# Patient Record
(demographics unavailable — no encounter records)

---

## 2024-10-11 NOTE — DISCUSSION/SUMMARY
[FreeTextEntry1] : Discussed proper use of medications as well as common and uncommon side effects of medications apply cortisone to face bid rto if no improvement or condition worsens

## 2024-10-11 NOTE — REVIEW OF SYSTEMS
[Rash] : rash [Insect Bites] : insect bites [Negative] : Genitourinary [FreeTextEntry1] : swelling r heel

## 2024-10-25 NOTE — DISCUSSION/SUMMARY
[FreeTextEntry1] : Rash c/w eczema vs tinea. Didn't resolve with eczema treatment so rec treating for tinea with Lotrimin AF 3x/day, x 3-4 weeks. If no improvement after 3 weeks, should see derm. RTC if worsening or other new symptoms.

## 2024-10-25 NOTE — PHYSICAL EXAM
[NL] : no acute distress, alert [de-identified] : small dry annular patch on R side of face under paci

## 2024-10-25 NOTE — HISTORY OF PRESENT ILLNESS
[de-identified] : Rash [FreeTextEntry6] : Used hydrocortisone on the spot on his face and it went away but immediately came back after stopping the hydrocortisone. Doesn't seem to bother him.

## 2025-01-02 NOTE — DISCUSSION/SUMMARY
[] : The components of the vaccine(s) to be administered today are listed in the plan of care. The disease(s) for which the vaccine(s) are intended to prevent and the risks have been discussed with the caretaker.  The risks are also included in the appropriate vaccination information statements which have been provided to the patient's caregiver.  The caregiver has given consent to vaccinate. [FreeTextEntry1] :  Well 18 mo M.  Continue whole cow's milk. Continue table foods, 3 meals with 2-3 snacks per day. Incorporate fluorinated water daily in a sippy cup. Brush teeth twice a day with soft toothbrush. When in car, keep child in rear-facing car seats until age 2, or until  the maximum height and weight for seat is reached. Put toddler to sleep in own bed or crib. Help toddler to maintain consistent daily routines and sleep schedule. Toilet training. Recognize anxiety in new settings. Ensure home is safe. Be within arm's reach of toddler at all times. Use consistent, positive discipline. Read aloud to toddler.  -Pentacel, Flu #2 -Next well visit at 2 yrs.

## 2025-01-02 NOTE — HISTORY OF PRESENT ILLNESS
[Father] : father [Cow's milk (Ounces per day ___)] : consumes [unfilled] oz of Cow's milk per day [Fruit] : fruit [Vegetables] : vegetables [Meat] : meat [Cereal] : cereal [Eggs] : eggs [Table food] : table food [Normal] : Normal [Pacifier use] : Pacifier use [Brushing teeth] : Brushing teeth [Playtime] : Playtime  [de-identified] : Routine questions.

## 2025-01-02 NOTE — PHYSICAL EXAM
[Alert] : alert [No Acute Distress] : no acute distress [Normocephalic] : normocephalic [Anterior [place] Closed] : anterior fontanelle closed [Red Reflex Bilateral] : red reflex bilateral [PERRL] : PERRL [Normally Placed Ears] : normally placed ears [Auricles Well Formed] : auricles well formed [Clear Tympanic membranes with present light reflex and bony landmarks] : clear tympanic membranes with present light reflex and bony landmarks [No Discharge] : no discharge [Nares Patent] : nares patent [Palate Intact] : palate intact [Uvula Midline] : uvula midline [Tooth Eruption] : tooth eruption  [Supple, full passive range of motion] : supple, full passive range of motion [No Palpable Masses] : no palpable masses [Symmetric Chest Rise] : symmetric chest rise [Clear to Auscultation Bilaterally] : clear to auscultation bilaterally [Regular Rate and Rhythm] : regular rate and rhythm [S1, S2 present] : S1, S2 present [No Murmurs] : no murmurs [+2 Femoral Pulses] : +2 femoral pulses [Soft] : soft [NonTender] : non tender [Non Distended] : non distended [No Hepatomegaly] : no hepatomegaly [Normoactive Bowel Sounds] : normoactive bowel sounds [No Splenomegaly] : no splenomegaly [Central Urethral Opening] : central urethral opening [Testicles Descended Bilaterally] : testicles descended bilaterally [Patent] : patent [Normally Placed] : normally placed [No Abnormal Lymph Nodes Palpated] : no abnormal lymph nodes palpated [No Clavicular Crepitus] : no clavicular crepitus [Symmetric Buttocks Creases] : symmetric buttocks creases [No Spinal Dimple] : no spinal dimple [NoTuft of Hair] : no tuft of hair [Cranial Nerves Grossly Intact] : cranial nerves grossly intact [No Rash or Lesions] : no rash or lesions

## 2025-01-15 NOTE — PHYSICAL EXAM
[NL] : regular rate and rhythm, normal S1, S2 audible, no murmurs [Moves All Extremities x 4] : moves all extremities x4 [Warm, Well Perfused x4] : warm, well perfused x4 [de-identified] : edema and erythema of L foot

## 2025-01-15 NOTE — DISCUSSION/SUMMARY
[FreeTextEntry1] : Child is a 18 mo old male here for cellulitis of L foot. Will treat with course of Augmentin, sent to pharmacy. Reviewed for worsening (fever, unable to ambulate, etc), call urgently. RTC for worsening or persistent symptoms.

## 2025-01-15 NOTE — HISTORY OF PRESENT ILLNESS
[de-identified] : foot swelling [FreeTextEntry6] : Child is a 18 mo old male here for foot swelling. Started yesterday. Located L foot. No known injury, puncture wound, etc. No fever. Able to ambulate. Normal appetite.

## 2025-04-01 NOTE — HISTORY OF PRESENT ILLNESS
[de-identified] : MMR Vaccine and Feeding Questions [FreeTextEntry6] : -Traveling to [place] later this month so would like an early MMR vaccine. -Is more of a grazer than a meal eater. Is his weight okay?

## 2025-04-01 NOTE — DISCUSSION/SUMMARY
[] : The components of the vaccine(s) to be administered today are listed in the plan of care. The disease(s) for which the vaccine(s) are intended to prevent and the risks have been discussed with the caretaker.  The risks are also included in the appropriate vaccination information statements which have been provided to the patient's caregiver.  The caregiver has given consent to vaccinate. [FreeTextEntry1] : -MMR with counseling -Excellent interval wt gain, reassured that he's getting enough kcals.

## 2025-06-26 NOTE — DEVELOPMENTAL MILESTONES
[Yes: _______] : yes, [unfilled] [Plays alongside other children] : plays alongside other children [Takes off some clothing] : takes off some clothing [Scoops well with spoon] : scoops well with spoon [Uses 50 words] : uses 50 words [Combine 2 words into phrase or] : does not combine 2 words into phrase or sentences [Follows 2-step command] : follows 2-step command [Uses words that are 50% intelligible] : uses words that are 50% intelligible to strangers [Kicks ball] : kicks ball  [Jumps off ground with 2 feet] : jumps off ground with 2 feet [Runs with coordination] : runs with coordination [Climbs up a ladder at a] : climbs up a ladder at a playground [Turns book pages] : turns book pages [Uses hands to turn objects] : uses hands to turn objects [Passed] : passed

## 2025-06-26 NOTE — PHYSICAL EXAM
[Alert] : alert [No Acute Distress] : no acute distress [Normocephalic] : normocephalic [Anterior [place] Closed] : anterior fontanelle closed [Red Reflex Bilateral] : red reflex bilateral [PERRL] : PERRL [Normally Placed Ears] : normally placed ears [Auricles Well Formed] : auricles well formed [Clear Tympanic membranes with present light reflex and bony landmarks] : clear tympanic membranes with present light reflex and bony landmarks [No Discharge] : no discharge [Nares Patent] : nares patent [Uvula Midline] : uvula midline [Palate Intact] : palate intact [Tooth Eruption] : tooth eruption  [Supple, full passive range of motion] : supple, full passive range of motion [No Palpable Masses] : no palpable masses [Symmetric Chest Rise] : symmetric chest rise [Clear to Auscultation Bilaterally] : clear to auscultation bilaterally [Regular Rate and Rhythm] : regular rate and rhythm [S1, S2 present] : S1, S2 present [No Murmurs] : no murmurs [+2 Femoral Pulses] : +2 femoral pulses [Soft] : soft [NonTender] : non tender [Non Distended] : non distended [Normoactive Bowel Sounds] : normoactive bowel sounds [No Hepatomegaly] : no hepatomegaly [No Splenomegaly] : no splenomegaly [Central Urethral Opening] : central urethral opening [Testicles Descended Bilaterally] : testicles descended bilaterally [Patent] : patent [Normally Placed] : normally placed [No Abnormal Lymph Nodes Palpated] : no abnormal lymph nodes palpated [No Clavicular Crepitus] : no clavicular crepitus [Symmetric Buttocks Creases] : symmetric buttocks creases [No Spinal Dimple] : no spinal dimple [NoTuft of Hair] : no tuft of hair [Cranial Nerves Grossly Intact] : cranial nerves grossly intact [No Rash or Lesions] : no rash or lesions

## 2025-06-26 NOTE — HISTORY OF PRESENT ILLNESS
[Parents] : parents [Fruit] : fruit [Meat] : meat [Cereal] : cereal [Eggs] : eggs [Table food] : table food [Dairy] : dairy [Normal] : Normal [In crib] : In crib [Brushing teeth] : Brushing teeth [Yes] : Patient goes to dentist yearly [Toothpaste] : Primary Fluoride Source: Toothpaste [Vegetables] : vegetables [Playtime 60 min a day] : Playtime 60 min a day [Temper Tantrums] : Temper Tantrums [Toilet Training] : Toilet training [de-identified] : Speech. Many words, not really putting them together. Mainly speak Citizen of [place] at home but also exposed to English. [de-identified] : Picky with veggies but keep trying [FreeTextEntry9] : not quite ready for potty training but sometimes will sit on the potty

## 2025-06-26 NOTE — PHYSICAL EXAM
[Alert] : alert [No Acute Distress] : no acute distress [Normocephalic] : normocephalic [Anterior [place] Closed] : anterior fontanelle closed [Red Reflex Bilateral] : red reflex bilateral [PERRL] : PERRL [Normally Placed Ears] : normally placed ears [Auricles Well Formed] : auricles well formed [Clear Tympanic membranes with present light reflex and bony landmarks] : clear tympanic membranes with present light reflex and bony landmarks [No Discharge] : no discharge [Nares Patent] : nares patent [Palate Intact] : palate intact [Uvula Midline] : uvula midline [Tooth Eruption] : tooth eruption  [Supple, full passive range of motion] : supple, full passive range of motion [No Palpable Masses] : no palpable masses [Symmetric Chest Rise] : symmetric chest rise [Clear to Auscultation Bilaterally] : clear to auscultation bilaterally [Regular Rate and Rhythm] : regular rate and rhythm [S1, S2 present] : S1, S2 present [No Murmurs] : no murmurs [+2 Femoral Pulses] : +2 femoral pulses [Soft] : soft [NonTender] : non tender [Non Distended] : non distended [Normoactive Bowel Sounds] : normoactive bowel sounds [No Hepatomegaly] : no hepatomegaly [No Splenomegaly] : no splenomegaly [Central Urethral Opening] : central urethral opening [Testicles Descended Bilaterally] : testicles descended bilaterally [Patent] : patent [Normally Placed] : normally placed [No Abnormal Lymph Nodes Palpated] : no abnormal lymph nodes palpated [No Clavicular Crepitus] : no clavicular crepitus [Symmetric Buttocks Creases] : symmetric buttocks creases [No Spinal Dimple] : no spinal dimple [NoTuft of Hair] : no tuft of hair [Cranial Nerves Grossly Intact] : cranial nerves grossly intact [No Rash or Lesions] : no rash or lesions

## 2025-06-26 NOTE — HISTORY OF PRESENT ILLNESS
[Parents] : parents [Fruit] : fruit [Meat] : meat [Cereal] : cereal [Eggs] : eggs [Table food] : table food [Dairy] : dairy [Normal] : Normal [In crib] : In crib [Brushing teeth] : Brushing teeth [Yes] : Patient goes to dentist yearly [Toothpaste] : Primary Fluoride Source: Toothpaste [Vegetables] : vegetables [Playtime 60 min a day] : Playtime 60 min a day [Temper Tantrums] : Temper Tantrums [Toilet Training] : Toilet training [de-identified] : Speech. Many words, not really putting them together. Mainly speak [place] at home but also exposed to English. [de-identified] : Picky with veggies but keep trying [FreeTextEntry9] : not quite ready for potty training but sometimes will sit on the potty

## 2025-06-26 NOTE — DISCUSSION/SUMMARY
[] : The components of the vaccine(s) to be administered today are listed in the plan of care. The disease(s) for which the vaccine(s) are intended to prevent and the risks have been discussed with the caretaker.  The risks are also included in the appropriate vaccination information statements which have been provided to the patient's caregiver.  The caregiver has given consent to vaccinate. [FreeTextEntry1] :   Well 3 yo M.   Continue cow's milk. Continue table foods, 3 meals with 2-3 snacks per day. Incorporate fluorinated water daily in a sippy cup. Brush teeth twice a day with soft toothbrush. Recommend visit to dentist. When in car, keep child in rear-facing car seats until age 2, or until the maximum height and weight for seat is reached. Put toddler to sleep in own bed. Help toddler to maintain consistent daily routines and sleep schedule. Toilet training discussed. Ensure home is safe. Use consistent, positive discipline. Read aloud to toddler. Limit screen time to no more than 2 hours per day.   -HepA -H/H and Pb. -Mild speech delay. Provided info for EI and private ST. -Next well visit at 2.5 yrs.

## 2025-07-03 NOTE — DISCUSSION/SUMMARY
[FreeTextEntry1] : 2 year M with fever, likely viral illness. looks great. Reviewed supportive care with antipyretics, lukewarm bath or cool rag on the forehead. Reviewed that fever is part of the body's immune system. Should be seen again if fever persists >3 days, is >105, or develops other new symptoms.   Give fever another 1-2 days to break.

## 2025-07-03 NOTE — HISTORY OF PRESENT ILLNESS
[de-identified] : Fever [FreeTextEntry6] : x 3 days. Tmax 101. No runny nose, no cough, no ear tugging. Slept well last night. No rashes. Eating well. In good spirits generally but a little less active when his temp is up.